# Patient Record
(demographics unavailable — no encounter records)

---

## 2024-11-20 NOTE — HISTORY OF PRESENT ILLNESS
[Mother] : mother [Needs Immunizations] : Needs immunizations [Grade: ____] : Grade: [unfilled] [Drinks non-sweetened liquids] : drinks non-sweetened liquids  [Has friends] : has friends [At least 1 hour of physical activity a day] : at least 1 hour of physical activity a day [Has interests/participates in community activities/volunteers] : has interests/participates in community activities/volunteers. [Exposure to electronic nicotine delivery system] : exposure to electronic nicotine delivery system [Uses safety belts/safety equipment] : uses safety belts/safety equipment  [Eats meals with family] : does not eat meals with family [Sleep Concerns] : no sleep concerns [Eats regular meals including adequate fruits and vegetables] : does not eat regular meals including adequate fruits and vegetables [Calcium source] : no calcium source [Screen time (except homework) less than 2 hours a day] : screen time (except homework) less than 2 hours a day [Uses electronic nicotine delivery system] : does not use electronic nicotine delivery system [Uses tobacco] : does not use tobacco [Uses drugs] : does not use drugs  [Drinks alcohol] : does not drink alcohol [No] : Patient has not had sexual intercourse [Has ways to cope with stress] : has ways to cope with stress [Displays self-confidence] : displays self-confidence [Has problems with sleep] : does not have problems with sleep [Gets depressed, anxious, or irritable/has mood swings] : does not get depressed, anxious, or irritable/has mood swings [Has thought about hurting self or considered suicide] : has not thought about hurting self or considered suicide [With Teen] : teen [With Parent/Guardian] : parent/guardian [FreeTextEntry7] : 20 yr [facility] [de-identified] : Due for TDAP, declines today  [de-identified] : [employer]  [de-identified] : volleyball club team, [place] [email or web address]

## 2024-11-20 NOTE — PHYSICAL EXAM

## 2024-11-20 NOTE — DISCUSSION/SUMMARY
[Met privately with the adolescent for part of the office visit?] : Met privately with the adolescent for part of the office visit? Yes [Adolescent demonstrates understanding of his/her conditions and how to take prescribed medications?] : Adolescent demonstrates understanding of his/her conditions and how to take prescribed medications? Yes [Adolescent asks questions during each office  visit and participates in the care plan?] : Adolescent asks questions during each office visit and participates in the care plan? Yes [FreeTextEntry1] : Reviewed substance abuse alcohol intake, risk factors for sexually transmitted infections, diet and exercise habits and symptoms of depression. Sleep hygiene was discussed. Limit screen time to 2 hours/day and avoid screen 1 hour prior to sleep time. Use of SPF 30 or more and tick checks discussed.  All physical exam findings and vital signs were discussed. Patient should return in 1 year for well adult check.  5210 reviewed Cardiac checklist reviewed PHQ9 reviewed AMOR reviewed PPD placed, return for read. Declines TDAP, states will come back for vaccine.